# Patient Record
Sex: FEMALE | Race: WHITE | URBAN - METROPOLITAN AREA
[De-identification: names, ages, dates, MRNs, and addresses within clinical notes are randomized per-mention and may not be internally consistent; named-entity substitution may affect disease eponyms.]

---

## 2017-01-06 ENCOUNTER — HISTORICAL (OUTPATIENT)
Dept: SURGERY | Facility: HOSPITAL | Age: 57
End: 2017-01-06

## 2017-10-27 ENCOUNTER — HISTORICAL (OUTPATIENT)
Dept: RADIOLOGY | Facility: HOSPITAL | Age: 57
End: 2017-10-27

## 2018-04-09 ENCOUNTER — HISTORICAL (OUTPATIENT)
Dept: SURGERY | Facility: HOSPITAL | Age: 58
End: 2018-04-09

## 2022-04-30 NOTE — OP NOTE
DATE OF SURGERY:    04/09/2018    SURGEON:  Leo Quach MD    PREOPERATIVE DIAGNOSIS:  Personal history of colon polyps.    POSTOPERATIVE DIAGNOSIS:  Personal history of colon polyps.    PROCEDURE:  Flexible sigmoidoscopy.    ANESTHESIA:  Propofol.    ESTIMATED BLOOD LOSS:  None.    SPECIMENS:  None.    COMPLICATIONS:  None.    PROCEDURE IN DETAIL:  After informed consent was obtained, the patient was brought to the Operating Room.  Continuous EKG, pulse oximetry and intermittent blood pressure monitoring was utilized.  The patient was placed in the left lateral decubitis position and propofol was administered by member of the anesthesia team.  A digital rectal exam was performed with no mass or gross blood identified.  Colonoscope was inserted into the rectum, insufflated and gently advanced in a retrograde fashion.  The patient had multiple moderate sized sigmoid diverticula.  There was no luminal stenosis or mass, however, the colonoscope could not traverse the sigmoid colon.  Multiple attempts were made and abdominal pressure was applied, but were unsuccessful.  I then elected to abort the procedure.  The colon was decompressed and the colonoscope was slowly retracted into the rectum.  It was completely removed     from the patient.  She tolerated the procedure well.  There were no complications.  She was subsequently transferred to recovery in satisfactory condition.        ______________________________  MD GEE Plunkett/KEITH  DD:  04/09/2018  Time:  08:32AM  DT:  04/09/2018  Time:  10:54AM  Job #:  [id number]    cc: Renee Cobb MD